# Patient Record
(demographics unavailable — no encounter records)

---

## 2024-10-24 NOTE — HISTORY OF PRESENT ILLNESS
[FreeTextEntry1] : 55-year-old male with past medical history of HTN, HLD. Patient denies family history is an ex-smoker but quit 25 years ago.  The patient is active and walks 4miles daily, however patient states he has a high fat diet and eats a lot of cheese. In office today the patient's BP is high and patient states BP is usually high in the 150s. The patient states he lost 40lbs over the last year and used to have swelling but has improved since weight loss. 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  24: TC: 204, HDL: 42, T, LDL: 142

## 2024-10-24 NOTE — DISCUSSION/SUMMARY
[EKG obtained to assist in diagnosis and management of assessed problem(s)] : EKG obtained to assist in diagnosis and management of assessed problem(s) [FreeTextEntry1] : advised to follow dash diet, keep sodium less than 1500mg daily, increase fiber, low fat diet continue exercise  continue losartan-hctz 100/12.5 for now patient would like to try diet and weight loss before adding more bp medications  Increase Atorvastatin from 40mg to 80mg  blood work  f/u 4-6 weeks

## 2025-01-11 NOTE — HISTORY OF PRESENT ILLNESS
[de-identified] : 55-year-old male is here today for evaluation of his right shoulder.  Patient states the last few days he has been having pain to the posterior aspect of his right shoulder radiating up into his neck.  He had no recent injury or trauma.  He states he has had this in the past and had injections and it resolved.  He denies any pain radiating down the arm.  He denies any numbness or tingling in the arm.

## 2025-01-11 NOTE — IMAGING
[de-identified] : On examination of his right shoulder he has no tenderness over the anterior glenohumeral joint AC joint or the clavicle.  He is tender over the trapezius muscle, he has positive muscle spasm of the trapezius.  No midline tenderness over the cervical spine.  No paraspinal tenderness.  He has some stiffness with range of motion of his neck.  He has good range of motion of the shoulder.  Negative drop arm, negative apprehension and Denson.  He has 5 out of 5 strength, sensation is intact throughout, 2+ radial pulse.  X-rays taken in the office today of the right shoulder show some degenerative changes of the AC joint.  No fractures dislocations, or other bony abnormalities noted.   Xrays of the cervical spine show some degenerative changes with disc space narrowing.  No acute fractures or subluxations noted.

## 2025-01-11 NOTE — DISCUSSION/SUMMARY
[de-identified] : At this time he is hoping for a trigger point injection.  Will make him an appointment on Monday with Dar[facility] for a trigger point injection.  I gave him a prescription for now for an anti-inflammatory and a muscle relaxant.  I recommend he do some warm compresses. Patient will call me if any other problems or concerns.  Patient verbalized understanding and agreed with the plan, all questions were answered in the office today.

## 2025-01-28 NOTE — DISCUSSION/SUMMARY
[FreeTextEntry1] : advised to follow dash diet, keep sodium less than 1500mg daily, increase fiber, low fat diet continue exercise  continue losartan-hctz 100/12.5 for now patient would like to try diet and weight loss before adding more bp medications  continue Atorvastatin from 40mg to 80mg  labs/echo/f/u in 6 months

## 2025-01-28 NOTE — HISTORY OF PRESENT ILLNESS
[FreeTextEntry1] : 55-year-old male with past medical history of HTN, HLD.   Patient denies family history is an ex-smoker but quit 25 years ago.  The patient is active and walks 4miles daily, however patient states he has a high fat diet and eats a lot of cheese. In office today the patient's BP is high and patient states BP is usually high in the 150s. The patient states he lost 40lbs over the last year and used to have swelling but has improved since weight loss. 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  24: TC: 204, HDL: 42, T, LDL: 142  25:  atorvastatin increased last visit to 80 and is taking it with no issues. pt denies cp or sob.  labs not done.  pt bp controlled. pt does not exercise and walks daily with his dog twice a day.

## 2025-01-28 NOTE — HISTORY OF PRESENT ILLNESS
[FreeTextEntry1] : 55-year-old male with past medical history of HTN, HLD.   Patient denies family history is an ex-smoker but quit 25 years ago.  The patient is active and walks 4miles daily, however patient states he has a high fat diet and eats a lot of cheese. In office today the patient's BP is high and patient states BP is usually high in the 150s. The patient states he lost 40lbs over the last year and used to have swelling but has improved since weight loss. 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  24: TC: 204, HDL: 42, T, LDL: 142  25:  atorvastatin increased last visit to 80 and is taking it with no issues. pt denies cp or sob.  labs not done.  pt bp controlled. pt does not exercise and walks daily with his dog twice a day.

## 2025-07-29 NOTE — HISTORY OF PRESENT ILLNESS
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  The patient is active and walks 4miles daily, however patient states he has a high fat diet and eats a lot of cheese. In office today the patient's BP is high and patient states BP is usually high in the 150s. The patient states he lost 40lbs over the last year and used to have swelling but has improved since weight loss. 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  24: TC: 204, HDL: 42, T, LDL: 142  25:  atorvastatin increased last visit to 80 and is taking it with no issues. pt denies cp or sob.  labs not done.  pt bp controlled. pt does not exercise and walks daily with his dog twice a day.   25: 25: HDL: 44, T, LDL: 65, BNP: 23 glu: 104 A1c: 5.4  7/10/25: ECHO: LVEF 52%, GLS: -19.1%, E renetta: 0.62 m/s, e' sept: 0.09 m/s, lvot vti: 21.6 cm, MILD LAE, MILD MR, Asc Ao: 4.2 cm. DD1 pt now controlled on choleserol. pt denies cp or sob, pt walks sometimes but does not exercise.  pt complains of palpitations but it is rare.  reviewed apple phone.,

## 2025-07-29 NOTE — HISTORY OF PRESENT ILLNESS
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  The patient is active and walks 4miles daily, however patient states he has a high fat diet and eats a lot of cheese. In office today the patient's BP is high and patient states BP is usually high in the 150s. The patient states he lost 40lbs over the last year and used to have swelling but has improved since weight loss. 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  24: TC: 204, HDL: 42, T, LDL: 142  25:  atorvastatin increased last visit to 80 and is taking it with no issues. pt denies cp or sob.  labs not done.  pt bp controlled. pt does not exercise and walks daily with his dog twice a day.   25: 25: HDL: 44, T, LDL: 65, BNP: 23 glu: 104 A1c: 5.4  7/10/25: ECHO: LVEF 52%, GLS: -19.1%, E renetta: 0.62 m/s, e' sept: 0.09 m/s, lvot vti: 21.6 cm, MILD LAE, MILD MR, Asc Ao: 4.2 cm. DD1 pt now controlled on choleserol. pt denies cp or sob, pt walks sometimes but does not exercise.  pt complains of palpitations but it is rare.  reviewed apple phone.,

## 2025-07-29 NOTE — DISCUSSION/SUMMARY
[FreeTextEntry1] : advised to follow dash diet, keep sodium less than 1500mg daily, increase fiber, low fat diet continue exercise  continue losartan-hctz 100/12.5 for now patient would like to try diet and weight loss before adding more bp medications  continue Atorvastatin 80mg  pt to monitor palpitations on apple watch.  labs/f/u in 6 months